# Patient Record
Sex: FEMALE | Race: WHITE | NOT HISPANIC OR LATINO | URBAN - METROPOLITAN AREA
[De-identification: names, ages, dates, MRNs, and addresses within clinical notes are randomized per-mention and may not be internally consistent; named-entity substitution may affect disease eponyms.]

---

## 2019-08-19 ENCOUNTER — OFFICE (OUTPATIENT)
Dept: URBAN - METROPOLITAN AREA CLINIC 75 | Facility: CLINIC | Age: 44
End: 2019-08-19
Payer: MEDICAID

## 2019-08-19 VITALS
HEART RATE: 76 BPM | HEIGHT: 63 IN | WEIGHT: 236 LBS | DIASTOLIC BLOOD PRESSURE: 86 MMHG | SYSTOLIC BLOOD PRESSURE: 134 MMHG

## 2019-08-19 DIAGNOSIS — R10.11 RIGHT UPPER QUADRANT PAIN: ICD-10-CM

## 2019-08-19 DIAGNOSIS — R11.0 NAUSEA: ICD-10-CM

## 2019-08-19 DIAGNOSIS — R19.7 DIARRHEA, UNSPECIFIED: ICD-10-CM

## 2019-08-19 DIAGNOSIS — K21.9 GASTRO-ESOPHAGEAL REFLUX DISEASE WITHOUT ESOPHAGITIS: ICD-10-CM

## 2019-08-19 DIAGNOSIS — R10.13 EPIGASTRIC PAIN: ICD-10-CM

## 2019-08-19 PROCEDURE — 99204 OFFICE O/P NEW MOD 45 MIN: CPT | Performed by: INTERNAL MEDICINE

## 2019-08-19 RX ORDER — AMITRIPTYLINE HYDROCHLORIDE 10 MG/1
TABLET, FILM COATED ORAL
Qty: 60 | Refills: 5 | Status: ACTIVE
Start: 2019-08-19

## 2019-08-19 NOTE — SERVICEHPINOTES
Thank you very much for referring Ms. Barnard for evaluation. She know she is a pleasant 44-year-old white female who does have a history of reflux. She's here now because she was in the emergency room on 7/9 with complaints of chest pain, she describes the pain as sharp but also heavy. She does report shortness of air at times but not specifically when she was having chest pain. She was also having nausea, her nausea persists. Sometimes when she coughs she'll have emesis. She was given Protonix in the ER and she's had significant improvement in symptoms. She was recently started on sucralfate to use when necessary. There is no dysphagia, there is no odynophagia, there is no melena or hematemesis. She is not a smoker or drinker.She was living in [place]. She had an upper endoscopy there about a year and a half ago. She says that they have her on Dexilant. Insurance wouldn't cover it so she was using over-the-counter therapies. She had not been on a prescription PPI until she was put back on Protonix, she has been out of Dexilant since last year. She also says they told her her stomach empty slow but she has never had a gastric emptying study. Her blood sugars usually run between 130 and 190. She says that it's not been that long since she was diagnosed with diabetes.She also has go to the bathroom about an hour after she eats. This has been a problem for a couple of years. She also reports epigastric pain as well as right upper quadrant pain she says is sharp. Her mother had peptic ulcer disease. Family history is otherwise negative. She has no fevers or chills. She's had prior cholecystectomy. Her pain is not severe or debilitating. She has no nocturnal symptoms. She is in no distress, she does not look acutely ill.

## 2019-08-19 NOTE — SERVICENOTES
records reviewed.  CBC within normal limits.  CO2 21.  Glucose 172.  Remainder of comprehensive panel within normal limits.  HCG negative.  BNP 20.5.  Magnesium 2.2.  Lipase 75.  D-dimer 462.  Troponin >0.012.  HCG negative.